# Patient Record
Sex: FEMALE | Race: BLACK OR AFRICAN AMERICAN | NOT HISPANIC OR LATINO | URBAN - METROPOLITAN AREA
[De-identification: names, ages, dates, MRNs, and addresses within clinical notes are randomized per-mention and may not be internally consistent; named-entity substitution may affect disease eponyms.]

---

## 2021-11-09 ENCOUNTER — OFFICE VISIT (OUTPATIENT)
Dept: URBAN - METROPOLITAN AREA CLINIC 86 | Facility: CLINIC | Age: 61
End: 2021-11-09

## 2022-01-04 ENCOUNTER — OFFICE VISIT (OUTPATIENT)
Dept: URBAN - METROPOLITAN AREA CLINIC 86 | Facility: CLINIC | Age: 62
End: 2022-01-04

## 2022-02-01 ENCOUNTER — OFFICE VISIT (OUTPATIENT)
Dept: URBAN - METROPOLITAN AREA CLINIC 86 | Facility: CLINIC | Age: 62
End: 2022-02-01
Payer: COMMERCIAL

## 2022-02-01 VITALS
DIASTOLIC BLOOD PRESSURE: 61 MMHG | SYSTOLIC BLOOD PRESSURE: 122 MMHG | BODY MASS INDEX: 29.66 KG/M2 | HEART RATE: 80 BPM | TEMPERATURE: 96.9 F | HEIGHT: 65 IN | WEIGHT: 178 LBS

## 2022-02-01 DIAGNOSIS — G93.40 ENCEPHALOPATHY: ICD-10-CM

## 2022-02-01 DIAGNOSIS — R18.8 OTHER ASCITES: ICD-10-CM

## 2022-02-01 DIAGNOSIS — Z71.89 VACCINE COUNSELING: ICD-10-CM

## 2022-02-01 DIAGNOSIS — K76.0 FATTY LIVER: ICD-10-CM

## 2022-02-01 PROCEDURE — 99204 OFFICE O/P NEW MOD 45 MIN: CPT | Performed by: PHYSICIAN ASSISTANT

## 2022-02-01 RX ORDER — FOLIC ACID 1 MG/1
1 TABLET TABLET ORAL ONCE A DAY
Status: ACTIVE | COMMUNITY

## 2022-02-01 RX ORDER — FUROSEMIDE 20 MG/1
1 TABLET TABLET ORAL ONCE A DAY
Status: ACTIVE | COMMUNITY

## 2022-02-01 RX ORDER — POTASSIUM CHLORIDE 1.5 G/1.58G
1 PACKET WITH FOOD POWDER, FOR SOLUTION ORAL ONCE A DAY
Status: ACTIVE | COMMUNITY

## 2022-02-01 RX ORDER — BENAZEPRIL HYDROCHLORIDE 10 MG/1
1 TABLET TABLET, FILM COATED ORAL ONCE A DAY
Status: ACTIVE | COMMUNITY

## 2022-02-01 RX ORDER — NADOLOL 20 MG/1
2 TABLETS TABLET ORAL ONCE A DAY
Status: ACTIVE | COMMUNITY

## 2022-02-01 RX ORDER — SPIRONOLACTONE 25 MG/1
1 TABLET TABLET, FILM COATED ORAL
Status: ACTIVE | COMMUNITY

## 2022-02-01 RX ORDER — PANTOPRAZOLE SODIUM 40 MG/1
1 TABLET TABLET, DELAYED RELEASE ORAL ONCE A DAY
Status: ACTIVE | COMMUNITY

## 2022-02-01 NOTE — EXAM-PHYSICAL EXAM
General: pleasant, well developed, well nourished, no acute distress, non ill appearing Eyes- no scleral icterus HENT: normocephalic, atraumatic head, face mask covering mouth and nose Neck: supple, no JVD Chest: normal breath sounds, normal work of breathing Heart: regular rate and rhythm without murmur Abdomen: soft, non tender, non distended, bowel sounds present, no hepatomegaly, no splenomegaly, no ascites Musculoskeletal: normal gait and station Extremities: no edema, no asterixis, no palmar erythema Skin: no rashes, no jaundice Neurologic: no focal deficits, alert and oriented x 3 Psychiatric: stable mood, appropriate affect  General: pleasant, well developed, well nourished, no acute distress, non ill appearing Eyes- no scleral icterus HENT: normocephalic, atraumatic head, face mask covering mouth and nose Neck: supple, no JVD Chest: normal breath sounds, normal work of breathing Heart: regular rate and rhythm without murmur Abdomen: soft, non tender, non distended, bowel sounds present, no hepatomegaly, no splenomegaly, no ascites Musculoskeletal: normal gait and station Extremities: no edema, no asterixis, no palmar erythema Skin: no rashes, no jaundice Neurologic: no focal deficits, alert and oriented x 3 Psychiatric: stable mood, appropriate affect

## 2022-02-01 NOTE — HPI-TODAY'S VISIT:
Patient was referred by Coleen Sánchez NP for an evaluation of fatty liver.  A copy of this note will be sent to the referring provider.   Patient here for new patient visit in office.  In oct 2021 went to hospital. was planning to go to work.  Labs from October 2021 show hemoglobin low 11.4, platelets low 99,000, , ALT 74, total bilirubin 3.9, sodium 137, potassium 3.6, creatinine 0.96, HDL 41, , elevated.  Ammonia elevated 133, abdominal limited ultrasound from October 2021 shows no ascites.  Was supposed to have an EGD in August of last year, will see if this was done.  Had a hospitalization for acute encephalopathy.  On lactulose for this.  Duration of visit 45 mins with over 50% of the time explaining patients condition and treatment plan and reviewing records

## 2022-03-21 ENCOUNTER — OFFICE VISIT (OUTPATIENT)
Dept: URBAN - METROPOLITAN AREA CLINIC 86 | Facility: CLINIC | Age: 62
End: 2022-03-21

## 2022-03-30 ENCOUNTER — OFFICE VISIT (OUTPATIENT)
Dept: URBAN - METROPOLITAN AREA TELEHEALTH 2 | Facility: TELEHEALTH | Age: 62
End: 2022-03-30
Payer: COMMERCIAL

## 2022-03-30 VITALS — HEIGHT: 65 IN | WEIGHT: 185 LBS | BODY MASS INDEX: 30.82 KG/M2

## 2022-03-30 DIAGNOSIS — R74.8 ABNORMAL LIVER ENZYMES: ICD-10-CM

## 2022-03-30 DIAGNOSIS — R79.89 ELEVATED FERRITIN LEVEL: ICD-10-CM

## 2022-03-30 DIAGNOSIS — K76.0 FATTY LIVER: ICD-10-CM

## 2022-03-30 DIAGNOSIS — R76.0 ABNORMAL ANTIBODY TITER: ICD-10-CM

## 2022-03-30 PROCEDURE — 99214 OFFICE O/P EST MOD 30 MIN: CPT

## 2022-03-30 RX ORDER — FUROSEMIDE 20 MG/1
1 TABLET TABLET ORAL ONCE A DAY
Status: ACTIVE | COMMUNITY

## 2022-03-30 RX ORDER — FOLIC ACID 1 MG/1
1 TABLET TABLET ORAL ONCE A DAY
Status: ACTIVE | COMMUNITY

## 2022-03-30 RX ORDER — BENAZEPRIL HYDROCHLORIDE 10 MG/1
1 TABLET TABLET, FILM COATED ORAL ONCE A DAY
Status: ACTIVE | COMMUNITY

## 2022-03-30 RX ORDER — PANTOPRAZOLE SODIUM 40 MG/1
1 TABLET TABLET, DELAYED RELEASE ORAL ONCE A DAY
Status: ACTIVE | COMMUNITY

## 2022-03-30 RX ORDER — RIFAXIMIN 550 MG/1
1 TABLET TABLET ORAL TWICE A DAY
Qty: 60 | OUTPATIENT
Start: 2022-03-30 | End: 2022-04-29

## 2022-03-30 RX ORDER — POTASSIUM CHLORIDE 1.5 G/1.58G
1 PACKET WITH FOOD POWDER, FOR SOLUTION ORAL ONCE A DAY
Status: ACTIVE | COMMUNITY

## 2022-03-30 RX ORDER — NADOLOL 20 MG/1
2 TABLETS TABLET ORAL ONCE A DAY
Status: ACTIVE | COMMUNITY

## 2022-03-30 RX ORDER — CHOLESTYRAMINE 4 G/9G
1 SCOOP POWDER, FOR SUSPENSION ORAL ONCE A DAY
Status: ACTIVE | COMMUNITY

## 2022-03-30 RX ORDER — SPIRONOLACTONE 25 MG/1
1 TABLET TABLET, FILM COATED ORAL
Status: ACTIVE | COMMUNITY

## 2022-03-30 NOTE — HPI-TODAY'S VISIT:
Patient was referred by Coleen Sánchez NP for an evaluation of fatty liver.  A copy of this note will be sent to the referring provider.       Patient here for TH visit. she is on lactulose tid, having multiple bm's will send rx for xifaxan.   labs were not sent to us from [place] and reviewing now. MELD 21. will refer pt to [place] transplant clinic. overall feeling better and no symptoms.  feb 2022 labs na low 132, glucose 76, cr 1.04, albumin low 2.6. ammonia elevated 66. tb 5.7, ast 144, alt 71, alp 338. iron sat unable to calculate. ferritin elevated 839. f actin ab elevated 21, ama 6.3, smooth musc ab abnormal 1:20. immune to hep a. need hep b vaccine. hep c neg. alpha 1 MM.    RECAP: In oct 2021 went to hospital. was planning to go to work.  Labs from October 2021 show hemoglobin low 11.4, platelets low 99,000, , ALT 74, total bilirubin 3.9, sodium 137, potassium 3.6, creatinine 0.96, HDL 41, , elevated.  Ammonia elevated 133, abdominal limited ultrasound from October 2021 shows no ascites.  Was supposed to have an EGD in August of last year, will see if this was done.  Had a hospitalization for acute encephalopathy.  On lactulose for this.  Duration of visit 45 mins with over 50% of the time explaining patients condition and treatment plan and reviewing records

## 2022-04-04 ENCOUNTER — TELEPHONE ENCOUNTER (OUTPATIENT)
Dept: URBAN - METROPOLITAN AREA CLINIC 92 | Facility: CLINIC | Age: 62
End: 2022-04-04

## 2022-04-05 ENCOUNTER — TELEPHONE ENCOUNTER (OUTPATIENT)
Dept: URBAN - METROPOLITAN AREA CLINIC 92 | Facility: CLINIC | Age: 62
End: 2022-04-05

## 2022-04-06 ENCOUNTER — TELEPHONE ENCOUNTER (OUTPATIENT)
Dept: URBAN - METROPOLITAN AREA CLINIC 92 | Facility: CLINIC | Age: 62
End: 2022-04-06

## 2022-04-06 LAB
A/G RATIO: 0.9
ALBUMIN: 3
ALKALINE PHOSPHATASE: 301
ALT (SGPT): 48
AST (SGOT): 93
BASO (ABSOLUTE): 0
BASOS: 1
BILIRUBIN, TOTAL: 3.4
BUN/CREATININE RATIO: 9
BUN: 11
CALCIUM: 8.6
CARBON DIOXIDE, TOTAL: 22
CHLORIDE: 106
CREATININE: 1.2
EGFR: 51
EOS (ABSOLUTE): 0.4
EOS: 12
GLOBULIN, TOTAL: 3.3
GLUCOSE: 88
HEMATOCRIT: 34.5
HEMATOLOGY COMMENTS:: (no result)
HEMOGLOBIN: 11.2
HEREDITARY  HEMOCHROMATOSIS: (no result)
IMMATURE CELLS: (no result)
IMMATURE GRANS (ABS): 0
IMMATURE GRANULOCYTES: 0
IMMUNOGLOBULIN G, QN, SERUM: 1673
IMMUNOGLOBULIN M, QN, SERUM: 61
INR: 1.2
LYMPHS (ABSOLUTE): 0.9
LYMPHS: 25
Lab: (no result)
MCH: 31.6
MCHC: 32.5
MCV: 98
MONOCYTES(ABSOLUTE): 0.4
MONOCYTES: 10
NEUTROPHILS (ABSOLUTE): 1.9
NEUTROPHILS: 52
NRBC: (no result)
PLATELETS: 104
POTASSIUM: 4.1
PROTEIN, TOTAL: 6.3
PROTHROMBIN TIME: 13
RBC: 3.54
RDW: 13.6
SODIUM: 139
WBC: 3.6

## 2022-04-06 NOTE — HPI-TODAY'S VISIT:
Dear Isabel Butt, April 5 MRI shows lower thorax was normal. Liver had mild iron deposition. They did see morphologic changes of chronic liver disease with a diffusely nodular contour, small nodules, and caudate lobe hypertrophy. They did see a diffusely reticular pattern a bit lately but has been this compatible with extensive fibrosis.  They did not however see any suspicious liver lesions. The gallbladder was not well seen and may be absent.  No bile duct dilation seen. Spleen was top normal. Pancreas was normal. Adrenal glands were normal. Bilateral renal cysts were seen. Some prominent right cardiophrenic lymph nodes were seen measuring up to 1.9 x 1.0 cm. Extensive collateral vessels including esophageal varices, gastric wall varices and large pelvic collaterals were seen measuring up to 2.1 cm. Trace perihepatic ascites was seen. Overall they felt that you had signs of advanced chronic liver disease but with no suspicious lesions and they recommended a 6-month surveillance. They did see signs of portal hypertension including the esophageal varices, gastric varices and a large pelvic collaterals and trace perihepatic ascites. I saw from the notes that you were pending an EGD surveillance and that with Cheli had recommended for you to do that.  We agree with that issue. Dr. Leary

## 2022-04-07 ENCOUNTER — TELEPHONE ENCOUNTER (OUTPATIENT)
Dept: URBAN - METROPOLITAN AREA CLINIC 92 | Facility: CLINIC | Age: 62
End: 2022-04-07

## 2022-04-26 ENCOUNTER — TELEPHONE ENCOUNTER (OUTPATIENT)
Dept: URBAN - METROPOLITAN AREA CLINIC 92 | Facility: CLINIC | Age: 62
End: 2022-04-26

## 2022-04-28 ENCOUNTER — WEB ENCOUNTER (OUTPATIENT)
Dept: URBAN - METROPOLITAN AREA CLINIC 92 | Facility: CLINIC | Age: 62
End: 2022-04-28

## 2022-05-01 ENCOUNTER — ERX REFILL RESPONSE (OUTPATIENT)
Dept: URBAN - METROPOLITAN AREA CLINIC 86 | Facility: CLINIC | Age: 62
End: 2022-05-01

## 2022-05-01 RX ORDER — RIFAXIMIN 550 MG/1
TAKE 1 TABLET BY MOUTH TWICE (2) DAILY TABLET ORAL
Qty: 60 TABLET | Refills: 2 | OUTPATIENT

## 2022-05-01 RX ORDER — RIFAXIMIN 550 MG/1
TAKE 1 TABLET BY MOUTH TWICE (2) DAILY TABLET ORAL
Qty: 60 TABLET | Refills: 1 | OUTPATIENT

## 2022-05-04 ENCOUNTER — OFFICE VISIT (OUTPATIENT)
Dept: URBAN - METROPOLITAN AREA TELEHEALTH 2 | Facility: TELEHEALTH | Age: 62
End: 2022-05-04
Payer: COMMERCIAL

## 2022-05-04 VITALS — WEIGHT: 178 LBS | BODY MASS INDEX: 29.66 KG/M2 | HEIGHT: 65 IN

## 2022-05-04 DIAGNOSIS — K76.0 FATTY LIVER: ICD-10-CM

## 2022-05-04 DIAGNOSIS — R18.8 OTHER ASCITES: ICD-10-CM

## 2022-05-04 DIAGNOSIS — R74.8 ABNORMAL LIVER ENZYMES: ICD-10-CM

## 2022-05-04 DIAGNOSIS — G93.40 ENCEPHALOPATHY: ICD-10-CM

## 2022-05-04 PROCEDURE — 99214 OFFICE O/P EST MOD 30 MIN: CPT

## 2022-05-04 RX ORDER — FUROSEMIDE 20 MG/1
1 TABLET TABLET ORAL ONCE A DAY
Status: ACTIVE | COMMUNITY

## 2022-05-04 RX ORDER — RIFAXIMIN 550 MG/1
1 TABLET TABLET ORAL TWICE A DAY
Qty: 60 TABLET | Refills: 3

## 2022-05-04 RX ORDER — FOLIC ACID 1 MG/1
1 TABLET TABLET ORAL ONCE A DAY
Status: ACTIVE | COMMUNITY

## 2022-05-04 RX ORDER — BENAZEPRIL HYDROCHLORIDE 10 MG/1
1 TABLET TABLET, FILM COATED ORAL ONCE A DAY
Status: ACTIVE | COMMUNITY

## 2022-05-04 RX ORDER — SPIRONOLACTONE 25 MG/1
1 TABLET TABLET, FILM COATED ORAL
Status: ACTIVE | COMMUNITY

## 2022-05-04 RX ORDER — NADOLOL 20 MG/1
2 TABLETS TABLET ORAL ONCE A DAY
Status: ACTIVE | COMMUNITY

## 2022-05-04 RX ORDER — POTASSIUM CHLORIDE 1.5 G/1.58G
1 PACKET WITH FOOD POWDER, FOR SOLUTION ORAL ONCE A DAY
Status: ACTIVE | COMMUNITY

## 2022-05-04 RX ORDER — CHOLESTYRAMINE 4 G/9G
1 SCOOP POWDER, FOR SUSPENSION ORAL ONCE A DAY
Status: ACTIVE | COMMUNITY

## 2022-05-04 RX ORDER — PANTOPRAZOLE SODIUM 40 MG/1
1 TABLET TABLET, DELAYED RELEASE ORAL ONCE A DAY
Status: ACTIVE | COMMUNITY

## 2022-05-04 NOTE — HPI-TODAY'S VISIT:
Patient was referred by Coleen Sánchez NP for an evaluation of fatty liver.  A copy of this note will be sent to the referring provider.          Patient here for TH visit. she is on lactulose tid, having multiple bm's will send rx for xifaxan.   due to MELD 21-she was sent to the transplant team, appreciate input, MELD improved to 16, she is working on her diet and being monitored. they are planning to repeat imaging in oct 2022 and will in 3 months with just labs.    April 5 MRI shows lower thorax was normal. Liver had mild iron deposition. They did see morphologic changes of chronic liver disease with a diffusely nodular contour, small nodules, and caudate lobe hypertrophy. They did see a diffusely reticular pattern a bit lately but has been this compatible with extensive fibrosis.  They did not however see any suspicious liver lesions. The gallbladder was not well seen and may be absent.  No bile duct dilation seen. Spleen was top normal. Pancreas was normal. Adrenal glands were normal. Bilateral renal cysts were seen. Some prominent right cardiophrenic lymph nodes were seen measuring up to 1.9 x 1.0 cm. Extensive collateral vessels including esophageal varices, gastric wall varices and large pelvic collaterals were seen measuring up to 2.1 cm. Trace perihepatic ascites was seen. Overall they felt that you had signs of advanced chronic liver disease but with no suspicious lesions and they recommended a 6-month surveillance. They did see signs of portal hypertension including the esophageal varices, gastric varices and a large pelvic collaterals and trace perihepatic ascites. I saw from the notes that you were pending an EGD surveillance and that with Cheli had recommended for you to do that.    RECAP: feb 2022 labs na low 132, glucose 76, cr 1.04, albumin low 2.6. ammonia elevated 66. tb 5.7, ast 144, alt 71, alp 338. iron sat unable to calculate. ferritin elevated 839. f actin ab elevated 21, ama 6.3, smooth musc ab abnormal 1:20. immune to hep a. need hep b vaccine. hep c neg. alpha 1 MM.

## 2022-05-06 ENCOUNTER — TELEPHONE ENCOUNTER (OUTPATIENT)
Dept: URBAN - METROPOLITAN AREA CLINIC 92 | Facility: CLINIC | Age: 62
End: 2022-05-06

## 2022-08-03 ENCOUNTER — LAB OUTSIDE AN ENCOUNTER (OUTPATIENT)
Dept: URBAN - METROPOLITAN AREA TELEHEALTH 2 | Facility: TELEHEALTH | Age: 62
End: 2022-08-03

## 2022-08-15 ENCOUNTER — OFFICE VISIT (OUTPATIENT)
Dept: URBAN - METROPOLITAN AREA TELEHEALTH 2 | Facility: TELEHEALTH | Age: 62
End: 2022-08-15
Payer: COMMERCIAL

## 2022-08-15 VITALS
WEIGHT: 178 LBS | SYSTOLIC BLOOD PRESSURE: 127 MMHG | DIASTOLIC BLOOD PRESSURE: 80 MMHG | BODY MASS INDEX: 29.66 KG/M2 | HEIGHT: 65 IN

## 2022-08-15 DIAGNOSIS — K74.69 CIRRHOSIS, CRYPTOGENIC: ICD-10-CM

## 2022-08-15 DIAGNOSIS — G93.49 OTHER ENCEPHALOPATHY: ICD-10-CM

## 2022-08-15 DIAGNOSIS — R18.8 OTHER ASCITES: ICD-10-CM

## 2022-08-15 DIAGNOSIS — K76.0 FATTY LIVER: ICD-10-CM

## 2022-08-15 PROBLEM — 443913008: Status: ACTIVE | Noted: 2022-07-31

## 2022-08-15 PROBLEM — 365799007: Status: ACTIVE | Noted: 2022-07-31

## 2022-08-15 PROBLEM — 161646004 H/O: HIGH RISK MEDICATION: Status: ACTIVE | Noted: 2022-08-15

## 2022-08-15 PROBLEM — 166643006: Status: ACTIVE | Noted: 2022-07-31

## 2022-08-15 PROBLEM — 365589000: Status: ACTIVE | Noted: 2022-07-31

## 2022-08-15 PROCEDURE — 99214 OFFICE O/P EST MOD 30 MIN: CPT

## 2022-08-15 RX ORDER — PANTOPRAZOLE SODIUM 40 MG/1
1 TABLET TABLET, DELAYED RELEASE ORAL ONCE A DAY
Status: ACTIVE | COMMUNITY

## 2022-08-15 RX ORDER — RIFAXIMIN 550 MG/1
1 TABLET TABLET ORAL TWICE A DAY
Qty: 60 TABLET | Refills: 3
End: 2022-12-13

## 2022-08-15 RX ORDER — CHOLESTYRAMINE 4 G/9G
1 SCOOP POWDER, FOR SUSPENSION ORAL ONCE A DAY
Status: ON HOLD | COMMUNITY

## 2022-08-15 RX ORDER — RIFAXIMIN 550 MG/1
1 TABLET TABLET ORAL TWICE A DAY
Qty: 60 TABLET | Refills: 3 | Status: ACTIVE | COMMUNITY

## 2022-08-15 RX ORDER — MULTIVITAMIN
1 TABLET TABLET ORAL ONCE A DAY
Status: ACTIVE | COMMUNITY

## 2022-08-15 RX ORDER — FOLIC ACID 1 MG/1
1 TABLET TABLET ORAL ONCE A DAY
Status: ACTIVE | COMMUNITY

## 2022-08-15 RX ORDER — NADOLOL 20 MG/1
2 TABLETS TABLET ORAL ONCE A DAY
Status: ACTIVE | COMMUNITY

## 2022-08-15 RX ORDER — BENAZEPRIL HYDROCHLORIDE 10 MG/1
1 TABLET TABLET, FILM COATED ORAL ONCE A DAY
Status: ACTIVE | COMMUNITY

## 2022-08-15 RX ORDER — LACTULOSE 10 G/15ML
15 ML AS NEEDED SOLUTION ORAL ONCE A DAY
Status: ACTIVE | COMMUNITY

## 2022-08-15 RX ORDER — SPIRONOLACTONE 25 MG/1
1 TABLET TABLET, FILM COATED ORAL ONCE A DAY
Status: ACTIVE | COMMUNITY

## 2022-08-15 RX ORDER — POTASSIUM CHLORIDE 1.5 G/1.58G
1 PACKET WITH FOOD POWDER, FOR SOLUTION ORAL ONCE A DAY
Status: ACTIVE | COMMUNITY

## 2022-08-15 RX ORDER — FUROSEMIDE 20 MG/1
1 TABLET TABLET ORAL ONCE A DAY
Status: ACTIVE | COMMUNITY

## 2022-08-22 PROBLEM — 81308009 ENCEPHALOPATHY: Status: ACTIVE | Noted: 2022-08-22

## 2022-09-12 ENCOUNTER — WEB ENCOUNTER (OUTPATIENT)
Dept: URBAN - METROPOLITAN AREA CLINIC 86 | Facility: CLINIC | Age: 62
End: 2022-09-12

## 2022-11-14 PROBLEM — 389026000: Status: ACTIVE | Noted: 2022-02-01

## 2022-11-14 PROBLEM — 266468003 CIRRHOSIS - NON-ALCOHOLIC: Status: ACTIVE | Noted: 2022-08-15

## 2022-11-14 PROBLEM — 81308009: Status: ACTIVE | Noted: 2022-02-01

## 2022-11-14 PROBLEM — 60737008 IRON OVERLOAD: Status: ACTIVE | Noted: 2022-07-31

## 2022-11-14 PROBLEM — 197321007 FATTY LIVER: Status: ACTIVE | Noted: 2022-02-01

## 2022-11-15 ENCOUNTER — OFFICE VISIT (OUTPATIENT)
Dept: URBAN - METROPOLITAN AREA CLINIC 86 | Facility: CLINIC | Age: 62
End: 2022-11-15

## 2022-11-15 RX ORDER — RIFAXIMIN 550 MG/1
1 TABLET TABLET ORAL TWICE A DAY
Qty: 60 TABLET | Refills: 3

## 2022-11-15 NOTE — HPI-TODAY'S VISIT:
Patient is a 61 yo f and last seen may 2022 by Ms Primitivo Green and prior referred by Coleen Sánchez NP for an evaluation of fatty liver and cirrhosis and pse.   A copy of this note will be sent to the referring provider.        May visit with Cheli: Patient here for TH visit. she is on lactulose tid, having multiple bm's will send rx for xifaxan.   She did get it and she says it is helping with the memory.  Did labs today and will take a few days and [facility]  Prior due to MELD 21-she was sent to the transplant team, and they saw and we appreciate their input, MELD improved to 16, she is working on her diet and wanted her to be monitored.   They are planning to repeat imaging in Nov 15 and do labs and see Dr Khan then also.    She says she saw them already.  April 5 MRI shows lower thorax was normal. Liver had mild iron deposition. They did see morphologic changes of chronic liver disease with a diffusely nodular contour, small nodules, and caudate lobe hypertrophy. They did see a diffusely reticular pattern a bit lately but has been this compatible with extensive fibrosis.  They did not however see any suspicious liver lesions. The gallbladder was not well seen and may be absent.  No bile duct dilation seen. Spleen was top normal. Pancreas was normal. Adrenal glands were normal. Bilateral renal cysts were seen. Some prominent right cardiophrenic lymph nodes were seen measuring up to 1.9 x 1.0 cm. Extensive collateral vessels including esophageal varices, gastric wall varices and large pelvic collaterals were seen measuring up to 2.1 cm. Trace perihepatic ascites was seen. Overall they felt that you had signs of advanced chronic liver disease but with no suspicious lesions and they recommended a 6-month surveillance. They did see signs of portal hypertension including the esophageal varices, gastric varices and a large pelvic collaterals and trace perihepatic ascites.  I saw from the notes that you were pending an EGD surveillance and and she is doing that with local doctor for egd and colon.  feb 2022 labs na low 132, glucose 76, cr 1.04, albumin low 2.6. ammonia elevated 66. tb 5.7, ast 144, alt 71, alp 338. iron sat unable to calculate. ferritin elevated 839. f actin ab elevated 21, ama 6.3, smooth musc ab abnormal 1:20. immune to hep a. need hep b vaccine. hep c neg. alpha 1 MM.  Plan: 1. We need to be sure that we get the labs that she is doing. 2. Nov 15 mri and labs and lamonte to seem 3. Planning to see her at end of november and see how doing. 4. Staying on the low fat diet.  Stressed to pt the need for social distancing and strict handwashing and wearing a mask and to follow any other new or added CDC recommendations as this is an evolving target.  Duration of the visit was 30 min with 10 min of chart prep reviewing data and information that was available for the visit and setting up in ecw and then an additional 20 min from 124 to 144 pm by clock today as healow clock fluxed for the telemed visit today via [employer]  with time spent reviewing said material and their clinical correlates and then with this information being used to formulate a treatment plan.

## 2023-02-10 ENCOUNTER — ERX REFILL RESPONSE (OUTPATIENT)
Dept: URBAN - METROPOLITAN AREA CLINIC 86 | Facility: CLINIC | Age: 63
End: 2023-02-10

## 2023-02-10 RX ORDER — RIFAXIMIN 550 MG/1
TAKE 1 TABLET BY MOUTH TWICE (2) DAILY TABLET ORAL
Qty: 60 TABLET | Refills: 0 | OUTPATIENT

## 2023-02-10 RX ORDER — RIFAXIMIN 550 MG/1
1 TABLET TABLET ORAL TWICE A DAY
Qty: 60 TABLET | Refills: 3 | OUTPATIENT

## 2023-07-14 ENCOUNTER — OFFICE VISIT (OUTPATIENT)
Dept: URBAN - METROPOLITAN AREA TELEHEALTH 2 | Facility: TELEHEALTH | Age: 63
End: 2023-07-14
Payer: COMMERCIAL

## 2023-07-14 VITALS — HEIGHT: 65 IN

## 2023-07-14 DIAGNOSIS — K76.0 FATTY LIVER: ICD-10-CM

## 2023-07-14 DIAGNOSIS — R18.8 OTHER ASCITES: ICD-10-CM

## 2023-07-14 DIAGNOSIS — K74.69 CIRRHOSIS, CRYPTOGENIC: ICD-10-CM

## 2023-07-14 DIAGNOSIS — R74.8 ABNORMAL LIVER ENZYMES: ICD-10-CM

## 2023-07-14 PROCEDURE — 99443 PHONE E/M BY PHYS 21-30 MIN: CPT

## 2023-07-14 PROCEDURE — 99214 OFFICE O/P EST MOD 30 MIN: CPT

## 2023-07-14 RX ORDER — RIFAXIMIN 550 MG/1
TAKE 1 TABLET BY MOUTH TWICE (2) DAILY TABLET ORAL
Qty: 60 TABLET | Refills: 2

## 2023-07-14 RX ORDER — NADOLOL 20 MG/1
2 TABLETS TABLET ORAL ONCE A DAY
Status: ACTIVE | COMMUNITY

## 2023-07-14 RX ORDER — CHOLESTYRAMINE 4 G/9G
1 SCOOP POWDER, FOR SUSPENSION ORAL ONCE A DAY
Status: ON HOLD | COMMUNITY

## 2023-07-14 RX ORDER — FUROSEMIDE 20 MG/1
1 TABLET TABLET ORAL ONCE A DAY
Status: ACTIVE | COMMUNITY

## 2023-07-14 RX ORDER — FOLIC ACID 1 MG/1
1 TABLET TABLET ORAL ONCE A DAY
Status: ACTIVE | COMMUNITY

## 2023-07-14 RX ORDER — PANTOPRAZOLE SODIUM 40 MG/1
1 TABLET TABLET, DELAYED RELEASE ORAL ONCE A DAY
Status: ACTIVE | COMMUNITY

## 2023-07-14 RX ORDER — RIFAXIMIN 550 MG/1
TAKE 1 TABLET BY MOUTH TWICE (2) DAILY TABLET ORAL
Qty: 60 TABLET | Refills: 0 | Status: ACTIVE | COMMUNITY

## 2023-07-14 RX ORDER — POTASSIUM CHLORIDE 1.5 G/1.58G
1 PACKET WITH FOOD POWDER, FOR SOLUTION ORAL ONCE A DAY
Status: ACTIVE | COMMUNITY

## 2023-07-14 RX ORDER — BENAZEPRIL HYDROCHLORIDE 10 MG/1
1 TABLET TABLET, FILM COATED ORAL ONCE A DAY
Status: ACTIVE | COMMUNITY

## 2023-07-14 RX ORDER — MULTIVITAMIN
1 TABLET TABLET ORAL ONCE A DAY
Status: ACTIVE | COMMUNITY

## 2023-07-14 RX ORDER — LACTULOSE 10 G/15ML
15 ML AS NEEDED SOLUTION ORAL ONCE A DAY
Status: ACTIVE | COMMUNITY

## 2023-07-14 RX ORDER — LACTULOSE 10 G/15ML
15 ML AS NEEDED SOLUTION ORAL
Qty: 1350 ML | Refills: 2

## 2023-07-14 RX ORDER — SPIRONOLACTONE 25 MG/1
1 TABLET TABLET, FILM COATED ORAL ONCE A DAY
Status: ACTIVE | COMMUNITY

## 2023-07-14 NOTE — HPI-TODAY'S VISIT:
Patient is a 64 yo f and last seen August 2022 and prior referred by Coleen Sánchez NP for an evaluation of fatty liver and cirrhosis and pse. A copy of this note will be sent to the referring provider.       7/13/23 telemed Was suppposed to be seen in nov but canceled and was not contacted to rebook by the office July 11, 2023 labs with INR 1.56, complete blood count with white blood cells 5.6, red blood cells 3.91, hemoglobin 9.3, platelets 136, MCV 92, AFP 4.2.  Sodium 133, potassium 3.8, creatinine up at 1.34, alkaline phosphatase 239, AST 91, ALT 47, bilirubin 5.6.  Previously on March 6 30th the bilirubin was 5.2, alkaline phosphatase 278,  and ALT 68.  3/2023 MRI EXAM: MR ABDOMEN W AND WO CONTRAST   CLINICAL INDICATION: Liver cirrhosis.   TECHNIQUE: Multisequence, multiplanar MRI of the abdomen was performed without and with intravenous contrast. [id number].[id number]   COMPARISON: 4/5/2022   FINDINGS:  Lower Thorax: Normal.   Liver: No fat or iron. Cirrhotic multinodular liver with reticular fibrosis. No hepatocellular carcinoma.   Gallbladder/Biliary Tree: Status post cholecystectomy.   Spleen: Mildly enlarged measuring 14 cm in AP dimension.   Pancreas: Normal.   Adrenal Glands: Normal.    Kidneys/Ureters: Renal cysts.   Gastrointestinal: Susceptibility artifact in the upper abdomen using a GE junction limiting evaluation..    Lymph Nodes: Prominent periportal lymph nodes could be reactive.   Vessels: Dilated varicose IMV likely due to pelvic portacaval shunt. Small upper abdominal varices Patent portal vein.   Peritoneum/Retroperitoneum: Trace ascites.   Bones/Soft Tissues: Mild anasarca.   IMPRESSION: End-stage cirrhosis with stigmata of portal hypertension. No hepatocellular carcinoma. Patent portal vein.   MELD 24 and on transplant list she is doing labs in 2 weeks to recheck the creatine. they lowered the dos eof the aldactone to every other day she is out of xifaxan  taking lactulose 3 times a day, discussed need to lower this if having the diarrhea egd done MRI due  recap May visit with Cheli: Patient here for TH visit. she is on lactulose tid, having multiple bm's will send rx for xifaxan.   She did get it and she says it is helping with the memory.  Did labs today and will take a few days and [facility].  Prior due to MELD 21-she was sent to the transplant team, and they saw and we appreciate their input, MELD improved to 16, she is working on her diet and wanted her to be monitored.   They are planning to repeat imaging in Nov 15 and do labs and see Dr Khan then also.    She says she saw them already.  April 5 MRI shows lower thorax was normal. Liver had mild iron deposition. They did see morphologic changes of chronic liver disease with a diffusely nodular contour, small nodules, and caudate lobe hypertrophy. They did see a diffusely reticular pattern a bit lately but has been this compatible with extensive fibrosis.  They did not however see any suspicious liver lesions. The gallbladder was not well seen and may be absent.  No bile duct dilation seen. Spleen was top normal. Pancreas was normal. Adrenal glands were normal. Bilateral renal cysts were seen. Some prominent right cardiophrenic lymph nodes were seen measuring up to 1.9 x 1.0 cm. Extensive collateral vessels including esophageal varices, gastric wall varices and large pelvic collaterals were seen measuring up to 2.1 cm. Trace perihepatic ascites was seen. Overall they felt that you had signs of advanced chronic liver disease but with no suspicious lesions and they recommended a 6-month surveillance. They did see signs of portal hypertension including the esophageal varices, gastric varices and a large pelvic collaterals and trace perihepatic ascites.  I saw from the notes that you were pending an EGD surveillance and and she is doing that with local doctor for egd and colon.  feb 2022 labs na low 132, glucose 76, cr 1.04, albumin low 2.6. ammonia elevated 66. tb 5.7, ast 144, alt 71, alp 338. iron sat unable to calculate. ferritin elevated 839. f actin ab elevated 21, ama 6.3, smooth musc ab abnormal 1:20. immune to hep a. need hep b vaccine. hep c neg. alpha 1 MM.

## 2023-07-17 ENCOUNTER — TELEPHONE ENCOUNTER (OUTPATIENT)
Dept: URBAN - METROPOLITAN AREA CLINIC 92 | Facility: CLINIC | Age: 63
End: 2023-07-17

## 2023-07-18 ENCOUNTER — TELEPHONE ENCOUNTER (OUTPATIENT)
Dept: URBAN - METROPOLITAN AREA CLINIC 92 | Facility: CLINIC | Age: 63
End: 2023-07-18

## 2023-07-21 ENCOUNTER — WEB ENCOUNTER (OUTPATIENT)
Dept: URBAN - METROPOLITAN AREA CLINIC 86 | Facility: CLINIC | Age: 63
End: 2023-07-21

## 2023-07-21 RX ORDER — RIFAXIMIN 550 MG/1
TAKE 1 TABLET BY MOUTH TWICE (2) DAILY TABLET ORAL
Qty: 60 TABLET | Refills: 0
End: 2023-10-19

## 2023-07-24 ENCOUNTER — TELEPHONE ENCOUNTER (OUTPATIENT)
Dept: URBAN - METROPOLITAN AREA CLINIC 86 | Facility: CLINIC | Age: 63
End: 2023-07-24

## 2023-08-10 ENCOUNTER — TELEPHONE ENCOUNTER (OUTPATIENT)
Dept: URBAN - METROPOLITAN AREA CLINIC 6 | Facility: CLINIC | Age: 63
End: 2023-08-10

## 2023-08-11 ENCOUNTER — LAB OUTSIDE AN ENCOUNTER (OUTPATIENT)
Dept: URBAN - METROPOLITAN AREA CLINIC 86 | Facility: CLINIC | Age: 63
End: 2023-08-11

## 2023-08-14 ENCOUNTER — TELEPHONE ENCOUNTER (OUTPATIENT)
Dept: URBAN - METROPOLITAN AREA CLINIC 86 | Facility: CLINIC | Age: 63
End: 2023-08-14

## 2023-08-16 ENCOUNTER — TELEPHONE ENCOUNTER (OUTPATIENT)
Dept: URBAN - METROPOLITAN AREA CLINIC 86 | Facility: CLINIC | Age: 63
End: 2023-08-16

## 2023-08-16 ENCOUNTER — OFFICE VISIT (OUTPATIENT)
Dept: URBAN - METROPOLITAN AREA TELEHEALTH 2 | Facility: TELEHEALTH | Age: 63
End: 2023-08-16
Payer: COMMERCIAL

## 2023-08-16 VITALS — BODY MASS INDEX: 23.73 KG/M2 | HEIGHT: 64 IN | WEIGHT: 139 LBS

## 2023-08-16 DIAGNOSIS — K76.0 FATTY LIVER: ICD-10-CM

## 2023-08-16 DIAGNOSIS — K74.69 CIRRHOSIS, CRYPTOGENIC: ICD-10-CM

## 2023-08-16 PROCEDURE — 99214 OFFICE O/P EST MOD 30 MIN: CPT

## 2023-08-16 RX ORDER — MULTIVITAMIN
1 TABLET TABLET ORAL ONCE A DAY
Status: ACTIVE | COMMUNITY

## 2023-08-16 RX ORDER — POTASSIUM CHLORIDE 1.5 G/1.58G
1 PACKET WITH FOOD POWDER, FOR SOLUTION ORAL ONCE A DAY
Status: ACTIVE | COMMUNITY

## 2023-08-16 RX ORDER — CHOLESTYRAMINE 4 G/9G
1 SCOOP POWDER, FOR SUSPENSION ORAL ONCE A DAY
COMMUNITY

## 2023-08-16 RX ORDER — NADOLOL 20 MG/1
2 TABLETS TABLET ORAL ONCE A DAY
Status: ACTIVE | COMMUNITY

## 2023-08-16 RX ORDER — FUROSEMIDE 20 MG/1
1 TABLET TABLET ORAL ONCE A DAY
Status: ACTIVE | COMMUNITY

## 2023-08-16 RX ORDER — FOLIC ACID 1 MG/1
1 TABLET TABLET ORAL ONCE A DAY
Status: ACTIVE | COMMUNITY

## 2023-08-16 RX ORDER — RIFAXIMIN 550 MG/1
TAKE 1 TABLET BY MOUTH TWICE (2) DAILY TABLET ORAL
Qty: 60 TABLET | Refills: 0 | Status: ACTIVE | COMMUNITY
End: 2023-10-19

## 2023-08-16 RX ORDER — SPIRONOLACTONE 25 MG/1
1 TABLET TABLET, FILM COATED ORAL ONCE A DAY
Status: ACTIVE | COMMUNITY

## 2023-08-16 RX ORDER — LACTULOSE 10 G/15ML
15 ML AS NEEDED SOLUTION ORAL
Qty: 1350 ML | Refills: 2 | Status: ACTIVE | COMMUNITY

## 2023-08-16 RX ORDER — BENAZEPRIL HYDROCHLORIDE 10 MG/1
1 TABLET TABLET, FILM COATED ORAL ONCE A DAY
Status: ACTIVE | COMMUNITY

## 2023-08-16 RX ORDER — PANTOPRAZOLE SODIUM 40 MG/1
1 TABLET TABLET, DELAYED RELEASE ORAL ONCE A DAY
Status: ACTIVE | COMMUNITY

## 2023-08-16 NOTE — HPI-TODAY'S VISIT:
Patient is a 64 yo f and last seen July 2023 and prior referred by Coleen Sánchez NP for an evaluation of fatty liver and cirrhosis and pse. A copy of this note will be sent to the referring provider.       8/16/23 tele she is still waiting on xifaxan and i reached out to adrienne discussed egd due in  soon in sept and also needs to be sure mri scheduled. Girard oltx ordreed She was needing dental clearance and conine t is tomorrow for oltx  she has appts for this.  she was offered samples but she [place] 7/13/23 telemed Was suppposed to be seen in nov but canceled and was not contacted to rebook by the office July 11, 2023 labs with INR 1.56, complete blood count with white blood cells 5.6, red blood cells 3.91, hemoglobin 9.3, platelets 136, MCV 92, AFP 4.2.  Sodium 133, potassium 3.8, creatinine up at 1.34, alkaline phosphatase 239, AST 91, ALT 47, bilirubin 5.6.  Previously on March 6 30th the bilirubin was 5.2, alkaline phosphatase 278,  and ALT 68.  3/2023 MRI EXAM: MR ABDOMEN W AND WO CONTRAST   CLINICAL INDICATION: Liver cirrhosis.   TECHNIQUE: Multisequence, multiplanar MRI of the abdomen was performed without and with intravenous contrast. ESRC.[id number]   COMPARISON: 4/5/2022   FINDINGS:  Lower Thorax: Normal.   Liver: No fat or iron. Cirrhotic multinodular liver with reticular fibrosis. No hepatocellular carcinoma.   Gallbladder/Biliary Tree: Status post cholecystectomy.   Spleen: Mildly enlarged measuring 14 cm in AP dimension.   Pancreas: Normal.   Adrenal Glands: Normal.    Kidneys/Ureters: Renal cysts.   Gastrointestinal: Susceptibility artifact in the upper abdomen using a GE junction limiting evaluation..    Lymph Nodes: Prominent periportal lymph nodes could be reactive.   Vessels: Dilated varicose IMV likely due to pelvic portacaval shunt. Small upper abdominal varices Patent portal vein.   Peritoneum/Retroperitoneum: Trace ascites.   Bones/Soft Tissues: Mild anasarca.   IMPRESSION: End-stage cirrhosis with stigmata of portal hypertension. No hepatocellular carcinoma. Patent portal vein.   MELD 24 and on transplant list she is doing labs in 2 weeks to recheck the creatine. they lowered the dos eof the aldactone to every other day she is out of xifaxan  taking lactulose 3 times a day, discussed need to lower this if having the diarrhea egd done MRI due  recap May visit with Cheli: Patient here for TH visit. she is on lactulose tid, having multiple bm's will send rx for xifaxan.   She did get it and she says it is helping with the memory.  Did labs today and will take a few days and [facility]  Prior due to MELD 21-she was sent to the transplant team, and they saw and we appreciate their input, MELD improved to 16, she is working on her diet and wanted her to be monitored.   They are planning to repeat imaging in Nov 15 and do labs and see Dr Khan then also.    She says she saw them already.  April 5 MRI shows lower thorax was normal. Liver had mild iron deposition. They did see morphologic changes of chronic liver disease with a diffusely nodular contour, small nodules, and caudate lobe hypertrophy. They did see a diffusely reticular pattern a bit lately but has been this compatible with extensive fibrosis.  They did not however see any suspicious liver lesions. The gallbladder was not well seen and may be absent.  No bile duct dilation seen. Spleen was top normal. Pancreas was normal. Adrenal glands were normal. Bilateral renal cysts were seen. Some prominent right cardiophrenic lymph nodes were seen measuring up to 1.9 x 1.0 cm. Extensive collateral vessels including esophageal varices, gastric wall varices and large pelvic collaterals were seen measuring up to 2.1 cm. Trace perihepatic ascites was seen. Overall they felt that you had signs of advanced chronic liver disease but with no suspicious lesions and they recommended a 6-month surveillance. They did see signs of portal hypertension including the esophageal varices, gastric varices and a large pelvic collaterals and trace perihepatic ascites.  I saw from the notes that you were pending an EGD surveillance and and she is doing that with local doctor for egd and colon.  feb 2022 labs na low 132, glucose 76, cr 1.04, albumin low 2.6. ammonia elevated 66. tb 5.7, ast 144, alt 71, alp 338. iron sat unable to calculate. ferritin elevated 839. f actin ab elevated 21, ama 6.3, smooth musc ab abnormal 1:20. immune to hep a. need hep b vaccine. hep c neg. alpha 1 MM.

## 2023-09-27 ENCOUNTER — LAB OUTSIDE AN ENCOUNTER (OUTPATIENT)
Dept: URBAN - METROPOLITAN AREA TELEHEALTH 2 | Facility: TELEHEALTH | Age: 63
End: 2023-09-27

## 2023-10-11 ENCOUNTER — OFFICE VISIT (OUTPATIENT)
Dept: URBAN - METROPOLITAN AREA TELEHEALTH 2 | Facility: TELEHEALTH | Age: 63
End: 2023-10-11
Payer: COMMERCIAL

## 2023-10-11 VITALS — BODY MASS INDEX: 23.73 KG/M2 | HEIGHT: 64 IN | WEIGHT: 139 LBS

## 2023-10-11 DIAGNOSIS — K76.0 FATTY LIVER: ICD-10-CM

## 2023-10-11 DIAGNOSIS — K76.82 HEPATIC ENCEPHALOPATHY: ICD-10-CM

## 2023-10-11 DIAGNOSIS — R18.8 OTHER ASCITES: ICD-10-CM

## 2023-10-11 DIAGNOSIS — K74.69 CIRRHOSIS, CRYPTOGENIC: ICD-10-CM

## 2023-10-11 DIAGNOSIS — G93.40 ENCEPHALOPATHY: ICD-10-CM

## 2023-10-11 PROCEDURE — 99214 OFFICE O/P EST MOD 30 MIN: CPT | Performed by: PHYSICIAN ASSISTANT

## 2023-10-11 RX ORDER — PANTOPRAZOLE SODIUM 40 MG/1
1 TABLET TABLET, DELAYED RELEASE ORAL ONCE A DAY
Status: ACTIVE | COMMUNITY

## 2023-10-11 RX ORDER — SPIRONOLACTONE 25 MG/1
1 TABLET TABLET, FILM COATED ORAL ONCE A DAY
Status: ACTIVE | COMMUNITY

## 2023-10-11 RX ORDER — RIFAXIMIN 550 MG/1
TAKE 1 TABLET BY MOUTH TWICE (2) DAILY TABLET ORAL
Qty: 60 TABLET | Refills: 0 | Status: ACTIVE | COMMUNITY
End: 2023-10-19

## 2023-10-11 RX ORDER — MULTIVITAMIN
1 TABLET TABLET ORAL ONCE A DAY
Status: ACTIVE | COMMUNITY

## 2023-10-11 RX ORDER — FOLIC ACID 1 MG/1
1 TABLET TABLET ORAL ONCE A DAY
Status: ACTIVE | COMMUNITY

## 2023-10-11 RX ORDER — LACTULOSE 10 G/15ML
15 ML AS NEEDED SOLUTION ORAL
Qty: 1350 ML | Refills: 2 | Status: ACTIVE | COMMUNITY

## 2023-10-11 RX ORDER — NADOLOL 20 MG/1
2 TABLETS TABLET ORAL ONCE A DAY
Status: ACTIVE | COMMUNITY

## 2023-10-11 RX ORDER — POTASSIUM CHLORIDE 1.5 G/1.58G
1 PACKET WITH FOOD POWDER, FOR SOLUTION ORAL ONCE A DAY
Status: ACTIVE | COMMUNITY

## 2023-10-11 RX ORDER — BENAZEPRIL HYDROCHLORIDE 10 MG/1
1 TABLET TABLET, FILM COATED ORAL ONCE A DAY
Status: ACTIVE | COMMUNITY

## 2023-10-11 RX ORDER — FUROSEMIDE 20 MG/1
1 TABLET TABLET ORAL ONCE A DAY
Status: ACTIVE | COMMUNITY

## 2023-10-11 RX ORDER — CHOLESTYRAMINE 4 G/9G
1 SCOOP POWDER, FOR SUSPENSION ORAL ONCE A DAY
COMMUNITY

## 2023-10-11 NOTE — HPI-TODAY'S VISIT:
Patient is a 64 yo f and last seen July 2023 and prior referred by Coleen Sánchez NP for an evaluation of fatty liver and cirrhosis and pse. A copy of this note will be sent to the referring provider.       10/11/23 telemed via [employer] Pt recently in hospital in beginning of oct due to lucy, hyponatremia, and MELD was 31. Taking lasix every other day and aldactone 25 mg.LUCY resolved with fluids and albumin. While admitted did MRI, cardiac PET to help with otlx evaluation. 10/5/23 labs with inr 2.4, wbc 2.6, rbc 2.99, hemoglobin 8.6, mcv 81, platelets 60, na 135, k 2.7, cr 1.18, bili 6.8, alp 135, ast 52, alt 29.  She is also taking folic acid  She is approved for oltx. has fu with them on 10/31MELD 25, MELD na 26 SHe is taking xifaxan and doing well with that   10/2/23 MRI  FINDINGS:  Lower Thorax: Normal. Liver: No fat or iron. Morphological changes of chronic liver disease characterized by contour nodularity, lobar redistribution, fissural widening and reticular fibrosis. In addition, there are numerous lesions throughout the liver demonstrating loss of signal on in phase compared to out of phase imaging consistent with siderotic nodules. Small focus of arterial enhancement measuring 7 mm in the hepatic dome (series 10, image 30) without definite correlate on additional sequences, may be perfusional. Otherwise, no focal suspicious hepatic lesion identified. Patent portal veins. Gallbladder/Biliary Tree: Status post cholecystectomy.&nbsp;Spleen: Normal.&nbsp;Pancreas: Normal.&nbsp;Adrenal Glands: Normal.Kidneys/Ureters: Redemonstration of scattered simple to mildly complex cysts. For instance, again seen is a right inferior renal pole exophytic hemorrhagic versus cyst with thin septation, measuring approximately 2.0 x 1.9 cm (series 8, image 62; previously 2.1 x 2.0 cm on examination dated 3/7/2023 and 1.8 x 1.8 cm on examination dated 4/5/2022)-Bosniak 2. No suspicious renal lesion or hydronephrosis.Gastrointestinal: Note is made of wall thickening involving the ascending colon, likely reflecting portal colopathy.  Lymph Nodes: Mildly prominent periportal lymph nodes, similar to prior. Vessels: Atherosclerotic disease of the abdominal aorta without aneurysmal dilatation. Patent hepatic vasculature. Redemonstrated of upper abdominal varices including perigastric, perisplenic, and along the anterior abdomen. Also again seen is a large collateral vessel extending into the portal vein and SMV confluence. Peritoneum/Retroperitoneum: Moderate volume ascites.Bones/Soft Tissues: Anasarca. Multilevel degenerative changes the spine IMPRESSION:1.  Morphologic changes of chronic liver disease with sequelae of portal hypertension including moderate volume ascites and upper abdominal varices. Patent portal veins.2.  Subcentimeter arterially enhancing hepatic dome lesion without correlate on other sequences, likely perfusional. Otherwise, no suspicious focal hepatic lesion identified. Recommend attention on follow-up.  recap 8/16/23 tele she is still waiting on xifaxan and i reached out to adrienne discussed egd due in  soon in sept and also needs to be sure mri scheduled. lamonte oltx ordreed She was needing dental clearance and connie t is tomorrow for oltx  she has appts for this.  she was offered samples but she lives very far 7/13/23 telemed Was suppposed to be seen in nov but canceled and was not contacted to rebook by the office July 11, 2023 labs with INR 1.56, complete blood count with white blood cells 5.6, red blood cells 3.91, hemoglobin 9.3, platelets 136, MCV 92, AFP 4.2.  Sodium 133, potassium 3.8, creatinine up at 1.34, alkaline phosphatase 239, AST 91, ALT 47, bilirubin 5.6.  Previously on March 6 30th the bilirubin was 5.2, alkaline phosphatase 278,  and ALT 68.  3/2023 MRI EXAM: MR ABDOMEN W AND WO CONTRAST   CLINICAL INDICATION: Liver cirrhosis.   TECHNIQUE: Multisequence, multiplanar MRI of the abdomen was performed without and with intravenous contrast. ESRC.2.7.3   COMPARISON: 4/5/2022   FINDINGS:  Lower Thorax: Normal.   Liver: No fat or iron. Cirrhotic multinodular liver with reticular fibrosis. No hepatocellular carcinoma.   Gallbladder/Biliary Tree: Status post cholecystectomy.   Spleen: Mildly enlarged measuring 14 cm in AP dimension.   Pancreas: Normal.   Adrenal Glands: Normal.    Kidneys/Ureters: Renal cysts.   Gastrointestinal: Susceptibility artifact in the upper abdomen using a GE junction limiting evaluation..    Lymph Nodes: Prominent periportal lymph nodes could be reactive.   Vessels: Dilated varicose IMV likely due to pelvic portacaval shunt. Small upper abdominal varices Patent portal vein.   Peritoneum/Retroperitoneum: Trace ascites.   Bones/Soft Tissues: Mild anasarca.   IMPRESSION: End-stage cirrhosis with stigmata of portal hypertension. No hepatocellular carcinoma. Patent portal vein.   MELD 24 and on transplant list she is doing labs in 2 weeks to recheck the creatine. they lowered the dos eof the aldactone to every other day she is out of xifaxan  taking lactulose 3 times a day, discussed need to lower this if having the diarrhea egd done MRI due  recap May visit with Cheli: Patient here for TH visit. she is on lactulose tid, having multiple bm's will send rx for xifaxan.   She did get it and she says it is helping with the memory.  Did labs today and will take a few days and [facility]  Prior due to MELD 21-she was sent to the transplant team, and they saw and we appreciate their input, MELD improved to 16, she is working on her diet and wanted her to be monitored.   They are planning to repeat imaging in Nov 15 and do labs and see Dr Khan then also.    She says she saw them already.  April 5 MRI shows lower thorax was normal. Liver had mild iron deposition. They did see morphologic changes of chronic liver disease with a diffusely nodular contour, small nodules, and caudate lobe hypertrophy. They did see a diffusely reticular pattern a bit lately but has been this compatible with extensive fibrosis.  They did not however see any suspicious liver lesions. The gallbladder was not well seen and may be absent.  No bile duct dilation seen. Spleen was top normal. Pancreas was normal. Adrenal glands were normal. Bilateral renal cysts were seen. Some prominent right cardiophrenic lymph nodes were seen measuring up to 1.9 x 1.0 cm. Extensive collateral vessels including esophageal varices, gastric wall varices and large pelvic collaterals were seen measuring up to 2.1 cm. Trace perihepatic ascites was seen. Overall they felt that you had signs of advanced chronic liver disease but with no suspicious lesions and they recommended a 6-month surveillance. They did see signs of portal hypertension including the esophageal varices, gastric varices and a large pelvic collaterals and trace perihepatic ascites.  I saw from the notes that you were pending an EGD surveillance and and she is doing that with local doctor for egd and colon.  feb 2022 labs na low 132, glucose 76, cr 1.04, albumin low 2.6. ammonia elevated 66. tb 5.7, ast 144, alt 71, alp 338. iron sat unable to calculate. ferritin elevated 839. f actin ab elevated 21, ama 6.3, smooth musc ab abnormal 1:20. immune to hep a. need hep b vaccine. hep c neg. alpha 1 MM.

## 2023-12-04 ENCOUNTER — LAB OUTSIDE AN ENCOUNTER (OUTPATIENT)
Dept: URBAN - METROPOLITAN AREA TELEHEALTH 2 | Facility: TELEHEALTH | Age: 63
End: 2023-12-04

## 2023-12-07 ENCOUNTER — OFFICE VISIT (OUTPATIENT)
Dept: URBAN - METROPOLITAN AREA TELEHEALTH 2 | Facility: TELEHEALTH | Age: 63
End: 2023-12-07

## 2023-12-07 ENCOUNTER — TELEPHONE ENCOUNTER (OUTPATIENT)
Dept: URBAN - METROPOLITAN AREA CLINIC 86 | Facility: CLINIC | Age: 63
End: 2023-12-07

## 2023-12-07 VITALS — WEIGHT: 138 LBS | BODY MASS INDEX: 23.56 KG/M2 | HEIGHT: 64 IN

## 2023-12-07 RX ORDER — LACTULOSE 10 G/15ML
15 ML AS NEEDED SOLUTION ORAL
Qty: 1350 ML | Refills: 2 | Status: ACTIVE | COMMUNITY

## 2023-12-07 RX ORDER — SPIRONOLACTONE 25 MG/1
1 TABLET TABLET, FILM COATED ORAL ONCE A DAY
Status: ACTIVE | COMMUNITY

## 2023-12-07 RX ORDER — BENAZEPRIL HYDROCHLORIDE 10 MG/1
1 TABLET TABLET, FILM COATED ORAL ONCE A DAY
Status: ACTIVE | COMMUNITY

## 2023-12-07 RX ORDER — FUROSEMIDE 20 MG/1
1 TABLET TABLET ORAL ONCE A DAY
Status: ACTIVE | COMMUNITY

## 2023-12-07 RX ORDER — NADOLOL 20 MG/1
2 TABLETS TABLET ORAL ONCE A DAY
Status: ACTIVE | COMMUNITY

## 2023-12-07 RX ORDER — MULTIVITAMIN
1 TABLET TABLET ORAL ONCE A DAY
Status: ACTIVE | COMMUNITY

## 2023-12-07 RX ORDER — PANTOPRAZOLE SODIUM 40 MG/1
1 TABLET TABLET, DELAYED RELEASE ORAL ONCE A DAY
Status: ACTIVE | COMMUNITY

## 2023-12-07 RX ORDER — POTASSIUM CHLORIDE 1.5 G/1.58G
1 PACKET WITH FOOD POWDER, FOR SOLUTION ORAL ONCE A DAY
Status: ACTIVE | COMMUNITY

## 2023-12-07 RX ORDER — CHOLESTYRAMINE 4 G/9G
1 SCOOP POWDER, FOR SUSPENSION ORAL ONCE A DAY
COMMUNITY

## 2023-12-07 RX ORDER — FOLIC ACID 1 MG/1
1 TABLET TABLET ORAL ONCE A DAY
Status: ACTIVE | COMMUNITY

## 2023-12-07 NOTE — HPI-TODAY'S VISIT:
Patient is a 64 yo f and last seen July 2023 and prior referred by Coleen Sánchez NP for an evaluation of fatty liver and cirrhosis and pse. A copy of this note will be sent to the referring provider.       10/11/23 telemed via [employer] Pt recently in hospital in beginning of oct due to lucy, hyponatremia, and MELD was 31. Taking lasix every other day and aldactone 25 mg.LUCY resolved with fluids and albumin. While admitted did MRI, cardiac PET to help with otlx evaluation. 10/5/23 labs with inr 2.4, wbc 2.6, rbc 2.99, hemoglobin 8.6, mcv 81, platelets 60, na 135, k 2.7, cr 1.18, bili 6.8, alp 135, ast 52, alt 29.  She is also taking folic acid  She is approved for oltx. has fu with them on 10/31MELD 25, MELD na 26 SHe is taking xifaxan and doing well with that   10/2/23 MRI  FINDINGS:  Lower Thorax: Normal. Liver: No fat or iron. Morphological changes of chronic liver disease characterized by contour nodularity, lobar redistribution, fissural widening and reticular fibrosis. In addition, there are numerous lesions throughout the liver demonstrating loss of signal on in phase compared to out of phase imaging consistent with siderotic nodules. Small focus of arterial enhancement measuring 7 mm in the hepatic dome (series 10, image 30) without definite correlate on additional sequences, may be perfusional. Otherwise, no focal suspicious hepatic lesion identified. Patent portal veins. Gallbladder/Biliary Tree: Status post cholecystectomy.&nbsp;Spleen: Normal.&nbsp;Pancreas: Normal.&nbsp;Adrenal Glands: Normal.Kidneys/Ureters: Redemonstration of scattered simple to mildly complex cysts. For instance, again seen is a right inferior renal pole exophytic hemorrhagic versus cyst with thin septation, measuring approximately 2.0 x 1.9 cm (series 8, image 62; previously 2.1 x 2.0 cm on examination dated 3/7/2023 and 1.8 x 1.8 cm on examination dated 4/5/2022)-Bosniak 2. No suspicious renal lesion or hydronephrosis.Gastrointestinal: Note is made of wall thickening involving the ascending colon, likely reflecting portal colopathy.  Lymph Nodes: Mildly prominent periportal lymph nodes, similar to prior. Vessels: Atherosclerotic disease of the abdominal aorta without aneurysmal dilatation. Patent hepatic vasculature. Redemonstrated of upper abdominal varices including perigastric, perisplenic, and along the anterior abdomen. Also again seen is a large collateral vessel extending into the portal vein and SMV confluence. Peritoneum/Retroperitoneum: Moderate volume ascites.Bones/Soft Tissues: Anasarca. Multilevel degenerative changes the spine IMPRESSION:1.  Morphologic changes of chronic liver disease with sequelae of portal hypertension including moderate volume ascites and upper abdominal varices. Patent portal veins.2.  Subcentimeter arterially enhancing hepatic dome lesion without correlate on other sequences, likely perfusional. Otherwise, no suspicious focal hepatic lesion identified. Recommend attention on follow-up.  recap 8/16/23 tele she is still waiting on xifaxan and i reached out to adrienne discussed egd due in  soon in sept and also needs to be sure mri scheduled. lamonte oltx ordreed She was needing dental clearance and connie t is tomorrow for oltx  she has appts for this.  she was offered samples but she lives very far 7/13/23 telemed Was suppposed to be seen in nov but canceled and was not contacted to rebook by the office July 11, 2023 labs with INR 1.56, complete blood count with white blood cells 5.6, red blood cells 3.91, hemoglobin 9.3, platelets 136, MCV 92, AFP 4.2.  Sodium 133, potassium 3.8, creatinine up at 1.34, alkaline phosphatase 239, AST 91, ALT 47, bilirubin 5.6.  Previously on March 6 30th the bilirubin was 5.2, alkaline phosphatase 278,  and ALT 68.  3/2023 MRI EXAM: MR ABDOMEN W AND WO CONTRAST   CLINICAL INDICATION: Liver cirrhosis.   TECHNIQUE: Multisequence, multiplanar MRI of the abdomen was performed without and with intravenous contrast. ESRC.2.7.3   COMPARISON: 4/5/2022   FINDINGS:  Lower Thorax: Normal.   Liver: No fat or iron. Cirrhotic multinodular liver with reticular fibrosis. No hepatocellular carcinoma.   Gallbladder/Biliary Tree: Status post cholecystectomy.   Spleen: Mildly enlarged measuring 14 cm in AP dimension.   Pancreas: Normal.   Adrenal Glands: Normal.    Kidneys/Ureters: Renal cysts.   Gastrointestinal: Susceptibility artifact in the upper abdomen using a GE junction limiting evaluation..    Lymph Nodes: Prominent periportal lymph nodes could be reactive.   Vessels: Dilated varicose IMV likely due to pelvic portacaval shunt. Small upper abdominal varices Patent portal vein.   Peritoneum/Retroperitoneum: Trace ascites.   Bones/Soft Tissues: Mild anasarca.   IMPRESSION: End-stage cirrhosis with stigmata of portal hypertension. No hepatocellular carcinoma. Patent portal vein.   MELD 24 and on transplant list she is doing labs in 2 weeks to recheck the creatine. they lowered the dos eof the aldactone to every other day she is out of xifaxan  taking lactulose 3 times a day, discussed need to lower this if having the diarrhea egd done MRI due  recap May visit with Cheli: Patient here for TH visit. she is on lactulose tid, having multiple bm's will send rx for xifaxan.   She did get it and she says it is helping with the memory.  Did labs today and will take a few days and [facility]  Prior due to MELD 21-she was sent to the transplant team, and they saw and we appreciate their input, MELD improved to 16, she is working on her diet and wanted her to be monitored.   They are planning to repeat imaging in Nov 15 and do labs and see Dr Khan then also.    She says she saw them already.  April 5 MRI shows lower thorax was normal. Liver had mild iron deposition. They did see morphologic changes of chronic liver disease with a diffusely nodular contour, small nodules, and caudate lobe hypertrophy. They did see a diffusely reticular pattern a bit lately but has been this compatible with extensive fibrosis.  They did not however see any suspicious liver lesions. The gallbladder was not well seen and may be absent.  No bile duct dilation seen. Spleen was top normal. Pancreas was normal. Adrenal glands were normal. Bilateral renal cysts were seen. Some prominent right cardiophrenic lymph nodes were seen measuring up to 1.9 x 1.0 cm. Extensive collateral vessels including esophageal varices, gastric wall varices and large pelvic collaterals were seen measuring up to 2.1 cm. Trace perihepatic ascites was seen. Overall they felt that you had signs of advanced chronic liver disease but with no suspicious lesions and they recommended a 6-month surveillance. They did see signs of portal hypertension including the esophageal varices, gastric varices and a large pelvic collaterals and trace perihepatic ascites.  I saw from the notes that you were pending an EGD surveillance and and she is doing that with local doctor for egd and colon.  feb 2022 labs na low 132, glucose 76, cr 1.04, albumin low 2.6. ammonia elevated 66. tb 5.7, ast 144, alt 71, alp 338. iron sat unable to calculate. ferritin elevated 839. f actin ab elevated 21, ama 6.3, smooth musc ab abnormal 1:20. immune to hep a. need hep b vaccine. hep c neg. alpha 1 MM.

## 2023-12-19 ENCOUNTER — TELEPHONE ENCOUNTER (OUTPATIENT)
Dept: URBAN - METROPOLITAN AREA CLINIC 92 | Facility: CLINIC | Age: 63
End: 2023-12-19

## 2023-12-19 RX ORDER — RIFAXIMIN 550 MG/1
TAKE 1 TABLET BY MOUTH TWICE (2) DAILY TABLET ORAL
Qty: 180 | Refills: 0

## 2024-01-04 ENCOUNTER — OFFICE VISIT (OUTPATIENT)
Dept: URBAN - METROPOLITAN AREA TELEHEALTH 2 | Facility: TELEHEALTH | Age: 64
End: 2024-01-04

## 2024-01-04 ENCOUNTER — TELEPHONE ENCOUNTER (OUTPATIENT)
Dept: URBAN - METROPOLITAN AREA CLINIC 86 | Facility: CLINIC | Age: 64
End: 2024-01-04

## 2024-01-04 RX ORDER — FOLIC ACID 1 MG/1
1 TABLET TABLET ORAL ONCE A DAY
Status: ACTIVE | COMMUNITY

## 2024-01-04 RX ORDER — PANTOPRAZOLE SODIUM 40 MG/1
1 TABLET TABLET, DELAYED RELEASE ORAL ONCE A DAY
Status: ACTIVE | COMMUNITY

## 2024-01-04 RX ORDER — NADOLOL 20 MG/1
2 TABLETS TABLET ORAL ONCE A DAY
Status: ACTIVE | COMMUNITY

## 2024-01-04 RX ORDER — LACTULOSE 10 G/15ML
15 ML AS NEEDED SOLUTION ORAL
Qty: 1350 ML | Refills: 2 | Status: ACTIVE | COMMUNITY

## 2024-01-04 RX ORDER — FUROSEMIDE 20 MG/1
1 TABLET TABLET ORAL ONCE A DAY
Status: ACTIVE | COMMUNITY

## 2024-01-04 RX ORDER — CHOLESTYRAMINE 4 G/9G
1 SCOOP POWDER, FOR SUSPENSION ORAL ONCE A DAY
COMMUNITY

## 2024-01-04 RX ORDER — BENAZEPRIL HYDROCHLORIDE 10 MG/1
1 TABLET TABLET, FILM COATED ORAL ONCE A DAY
Status: ACTIVE | COMMUNITY

## 2024-01-04 RX ORDER — RIFAXIMIN 550 MG/1
TAKE 1 TABLET BY MOUTH TWICE (2) DAILY TABLET ORAL
Qty: 180 | Refills: 0 | Status: ACTIVE | COMMUNITY

## 2024-01-04 RX ORDER — MULTIVITAMIN
1 TABLET TABLET ORAL ONCE A DAY
Status: ACTIVE | COMMUNITY

## 2024-01-04 RX ORDER — POTASSIUM CHLORIDE 1.5 G/1.58G
1 PACKET WITH FOOD POWDER, FOR SOLUTION ORAL ONCE A DAY
Status: ACTIVE | COMMUNITY

## 2024-01-04 RX ORDER — SPIRONOLACTONE 25 MG/1
1 TABLET TABLET, FILM COATED ORAL ONCE A DAY
Status: ACTIVE | COMMUNITY

## 2024-02-27 ENCOUNTER — TELEP (OUTPATIENT)
Dept: URBAN - METROPOLITAN AREA TELEHEALTH 2 | Facility: TELEHEALTH | Age: 64
End: 2024-02-27

## 2024-02-27 RX ORDER — LACTULOSE 10 G/15ML
15 ML AS NEEDED SOLUTION ORAL
Qty: 1350 ML | Refills: 2 | Status: ACTIVE | COMMUNITY

## 2024-02-27 RX ORDER — MULTIVITAMIN
1 TABLET TABLET ORAL ONCE A DAY
Status: ACTIVE | COMMUNITY

## 2024-02-27 RX ORDER — FUROSEMIDE 20 MG/1
1 TABLET TABLET ORAL ONCE A DAY
Status: ACTIVE | COMMUNITY

## 2024-02-27 RX ORDER — PANTOPRAZOLE SODIUM 40 MG/1
1 TABLET TABLET, DELAYED RELEASE ORAL ONCE A DAY
Status: ACTIVE | COMMUNITY

## 2024-02-27 RX ORDER — FOLIC ACID 1 MG/1
1 TABLET TABLET ORAL ONCE A DAY
Status: ACTIVE | COMMUNITY

## 2024-02-27 RX ORDER — SPIRONOLACTONE 25 MG/1
1 TABLET TABLET, FILM COATED ORAL ONCE A DAY
Status: ACTIVE | COMMUNITY

## 2024-02-27 RX ORDER — CHOLESTYRAMINE 4 G/9G
1 SCOOP POWDER, FOR SUSPENSION ORAL ONCE A DAY
COMMUNITY

## 2024-02-27 RX ORDER — RIFAXIMIN 550 MG/1
TAKE 1 TABLET BY MOUTH TWICE (2) DAILY TABLET ORAL
Qty: 180 | Refills: 0 | Status: ACTIVE | COMMUNITY

## 2024-02-27 RX ORDER — NADOLOL 20 MG/1
2 TABLETS TABLET ORAL ONCE A DAY
Status: ACTIVE | COMMUNITY

## 2024-02-27 RX ORDER — BENAZEPRIL HYDROCHLORIDE 10 MG/1
1 TABLET TABLET, FILM COATED ORAL ONCE A DAY
Status: ACTIVE | COMMUNITY

## 2024-02-27 RX ORDER — POTASSIUM CHLORIDE 1.5 G/1.58G
1 PACKET WITH FOOD POWDER, FOR SOLUTION ORAL ONCE A DAY
Status: ACTIVE | COMMUNITY

## 2024-02-27 NOTE — HPI-TODAY'S VISIT:
Patient is a 62 yo f and last seen July 2023 and prior referred by Coleen Sánchez NP for an evaluation of fatty liver and cirrhosis and pse. A copy of this note will be sent to the referring provider.       10/11/23 telemed via [employer] Pt recently in hospital in beginning of oct due to lucy, hyponatremia, and MELD was 31. Taking lasix every other day and aldactone 25 mg.LUCY resolved with fluids and albumin. While admitted did MRI, cardiac PET to help with otlx evaluation. 10/5/23 labs with inr 2.4, wbc 2.6, rbc 2.99, hemoglobin 8.6, mcv 81, platelets 60, na 135, k 2.7, cr 1.18, bili 6.8, alp 135, ast 52, alt 29.  She is also taking folic acid  She is approved for oltx. has fu with them on 10/31MELD 25, MELD na 26 SHe is taking xifaxan and doing well with that   10/2/23 MRI  FINDINGS:  Lower Thorax: Normal. Liver: No fat or iron. Morphological changes of chronic liver disease characterized by contour nodularity, lobar redistribution, fissural widening and reticular fibrosis. In addition, there are numerous lesions throughout the liver demonstrating loss of signal on in phase compared to out of phase imaging consistent with siderotic nodules. Small focus of arterial enhancement measuring 7 mm in the hepatic dome (series 10, image 30) without definite correlate on additional sequences, may be perfusional. Otherwise, no focal suspicious hepatic lesion identified. Patent portal veins. Gallbladder/Biliary Tree: Status post cholecystectomy.&nbsp;Spleen: Normal.&nbsp;Pancreas: Normal.&nbsp;Adrenal Glands: Normal.Kidneys/Ureters: Redemonstration of scattered simple to mildly complex cysts. For instance, again seen is a right inferior renal pole exophytic hemorrhagic versus cyst with thin septation, measuring approximately 2.0 x 1.9 cm (series 8, image 62; previously 2.1 x 2.0 cm on examination dated 3/7/2023 and 1.8 x 1.8 cm on examination dated 4/5/2022)-Bosniak 2. No suspicious renal lesion or hydronephrosis.Gastrointestinal: Note is made of wall thickening involving the ascending colon, likely reflecting portal colopathy.  Lymph Nodes: Mildly prominent periportal lymph nodes, similar to prior. Vessels: Atherosclerotic disease of the abdominal aorta without aneurysmal dilatation. Patent hepatic vasculature. Redemonstrated of upper abdominal varices including perigastric, perisplenic, and along the anterior abdomen. Also again seen is a large collateral vessel extending into the portal vein and SMV confluence. Peritoneum/Retroperitoneum: Moderate volume ascites.Bones/Soft Tissues: Anasarca. Multilevel degenerative changes the spine IMPRESSION:1.  Morphologic changes of chronic liver disease with sequelae of portal hypertension including moderate volume ascites and upper abdominal varices. Patent portal veins.2.  Subcentimeter arterially enhancing hepatic dome lesion without correlate on other sequences, likely perfusional. Otherwise, no suspicious focal hepatic lesion identified. Recommend attention on follow-up.  recap 8/16/23 tele she is still waiting on xifaxan and i reached out to adrienne discussed egd due in  soon in sept and also needs to be sure mri scheduled. lamonte oltx ordreed She was needing dental clearance and connie t is tomorrow for oltx  she has appts for this.  she was offered samples but she lives very far 7/13/23 telemed Was suppposed to be seen in nov but canceled and was not contacted to rebook by the office July 11, 2023 labs with INR 1.56, complete blood count with white blood cells 5.6, red blood cells 3.91, hemoglobin 9.3, platelets 136, MCV 92, AFP 4.2.  Sodium 133, potassium 3.8, creatinine up at 1.34, alkaline phosphatase 239, AST 91, ALT 47, bilirubin 5.6.  Previously on March 6 30th the bilirubin was 5.2, alkaline phosphatase 278,  and ALT 68.  3/2023 MRI EXAM: MR ABDOMEN W AND WO CONTRAST   CLINICAL INDICATION: Liver cirrhosis.   TECHNIQUE: Multisequence, multiplanar MRI of the abdomen was performed without and with intravenous contrast. ESRC.2.7.3   COMPARISON: 4/5/2022   FINDINGS:  Lower Thorax: Normal.   Liver: No fat or iron. Cirrhotic multinodular liver with reticular fibrosis. No hepatocellular carcinoma.   Gallbladder/Biliary Tree: Status post cholecystectomy.   Spleen: Mildly enlarged measuring 14 cm in AP dimension.   Pancreas: Normal.   Adrenal Glands: Normal.    Kidneys/Ureters: Renal cysts.   Gastrointestinal: Susceptibility artifact in the upper abdomen using a GE junction limiting evaluation..    Lymph Nodes: Prominent periportal lymph nodes could be reactive.   Vessels: Dilated varicose IMV likely due to pelvic portacaval shunt. Small upper abdominal varices Patent portal vein.   Peritoneum/Retroperitoneum: Trace ascites.   Bones/Soft Tissues: Mild anasarca.   IMPRESSION: End-stage cirrhosis with stigmata of portal hypertension. No hepatocellular carcinoma. Patent portal vein.   MELD 24 and on transplant list she is doing labs in 2 weeks to recheck the creatine. they lowered the dos eof the aldactone to every other day she is out of xifaxan  taking lactulose 3 times a day, discussed need to lower this if having the diarrhea egd done MRI due  recap May visit with Cheli: Patient here for TH visit. she is on lactulose tid, having multiple bm's will send rx for xifaxan.   She did get it and she says it is helping with the memory.  Did labs today and will take a few days and [facility]  Prior due to MELD 21-she was sent to the transplant team, and they saw and we appreciate their input, MELD improved to 16, she is working on her diet and wanted her to be monitored.   They are planning to repeat imaging in Nov 15 and do labs and see Dr Khan then also.    She says she saw them already.  April 5 MRI shows lower thorax was normal. Liver had mild iron deposition. They did see morphologic changes of chronic liver disease with a diffusely nodular contour, small nodules, and caudate lobe hypertrophy. They did see a diffusely reticular pattern a bit lately but has been this compatible with extensive fibrosis.  They did not however see any suspicious liver lesions. The gallbladder was not well seen and may be absent.  No bile duct dilation seen. Spleen was top normal. Pancreas was normal. Adrenal glands were normal. Bilateral renal cysts were seen. Some prominent right cardiophrenic lymph nodes were seen measuring up to 1.9 x 1.0 cm. Extensive collateral vessels including esophageal varices, gastric wall varices and large pelvic collaterals were seen measuring up to 2.1 cm. Trace perihepatic ascites was seen. Overall they felt that you had signs of advanced chronic liver disease but with no suspicious lesions and they recommended a 6-month surveillance. They did see signs of portal hypertension including the esophageal varices, gastric varices and a large pelvic collaterals and trace perihepatic ascites.  I saw from the notes that you were pending an EGD surveillance and and she is doing that with local doctor for egd and colon.  feb 2022 labs na low 132, glucose 76, cr 1.04, albumin low 2.6. ammonia elevated 66. tb 5.7, ast 144, alt 71, alp 338. iron sat unable to calculate. ferritin elevated 839. f actin ab elevated 21, ama 6.3, smooth musc ab abnormal 1:20. immune to hep a. need hep b vaccine. hep c neg. alpha 1 MM.

## 2024-03-13 ENCOUNTER — TELEP (OUTPATIENT)
Dept: URBAN - METROPOLITAN AREA TELEHEALTH 2 | Facility: TELEHEALTH | Age: 64
End: 2024-03-13
Payer: COMMERCIAL

## 2024-03-13 VITALS — BODY MASS INDEX: 22.2 KG/M2 | WEIGHT: 130 LBS | HEIGHT: 64 IN

## 2024-03-13 DIAGNOSIS — R18.8 OTHER ASCITES: ICD-10-CM

## 2024-03-13 DIAGNOSIS — K76.0 FATTY LIVER: ICD-10-CM

## 2024-03-13 DIAGNOSIS — G93.40 ENCEPHALOPATHY: ICD-10-CM

## 2024-03-13 DIAGNOSIS — K74.69 CIRRHOSIS, CRYPTOGENIC: ICD-10-CM

## 2024-03-13 PROBLEM — 161671001: Status: ACTIVE | Noted: 2024-03-13

## 2024-03-13 PROCEDURE — 99214 OFFICE O/P EST MOD 30 MIN: CPT | Performed by: PHYSICIAN ASSISTANT

## 2024-03-13 RX ORDER — SULFAMETHOXAZOLE AND TRIMETHOPRIM 400; 80 MG/1; MG/1
1 TABLET TABLET ORAL ONCE A DAY
Status: ACTIVE | COMMUNITY

## 2024-03-13 RX ORDER — LACTULOSE 10 G/15ML
15 ML AS NEEDED SOLUTION ORAL
Qty: 1350 ML | Refills: 2 | Status: ON HOLD | COMMUNITY

## 2024-03-13 RX ORDER — RIFAXIMIN 550 MG/1
TAKE 1 TABLET BY MOUTH TWICE (2) DAILY TABLET ORAL
Qty: 180 | Refills: 0 | Status: ON HOLD | COMMUNITY

## 2024-03-13 RX ORDER — BENAZEPRIL HYDROCHLORIDE 10 MG/1
1 TABLET TABLET, FILM COATED ORAL ONCE A DAY
Status: ON HOLD | COMMUNITY

## 2024-03-13 RX ORDER — TACROLIMUS 5 MG/1
AS DIRECTED CAPSULE, GELATIN COATED ORAL
Status: ACTIVE | COMMUNITY

## 2024-03-13 RX ORDER — ACYCLOVIR 400 MG/1
1 TABLET TABLET ORAL TWICE A DAY
Status: ACTIVE | COMMUNITY

## 2024-03-13 RX ORDER — PANTOPRAZOLE SODIUM 40 MG/1
1 TABLET TABLET, DELAYED RELEASE ORAL ONCE A DAY
Status: ACTIVE | COMMUNITY

## 2024-03-13 RX ORDER — FOLIC ACID 1 MG/1
1 TABLET TABLET ORAL ONCE A DAY
Status: ON HOLD | COMMUNITY

## 2024-03-13 RX ORDER — SPIRONOLACTONE 25 MG/1
1 TABLET TABLET, FILM COATED ORAL ONCE A DAY
Status: ON HOLD | COMMUNITY

## 2024-03-13 RX ORDER — PREDNISONE 5 MG/1
1 TABLET TABLET ORAL ONCE A DAY
Status: ACTIVE | COMMUNITY

## 2024-03-13 RX ORDER — TACROLIMUS 1 MG/1
AS DIRECTED GRANULE, FOR SUSPENSION ORAL
Status: ACTIVE | COMMUNITY

## 2024-03-13 RX ORDER — MAGNESIUM GLYCINATE 100 MG
AS DIRECTED CAPSULE ORAL
Status: ACTIVE | COMMUNITY

## 2024-03-13 RX ORDER — NADOLOL 20 MG/1
2 TABLETS TABLET ORAL ONCE A DAY
Status: ON HOLD | COMMUNITY

## 2024-03-13 RX ORDER — CHOLESTYRAMINE 4 G/9G
1 SCOOP POWDER, FOR SUSPENSION ORAL ONCE A DAY
COMMUNITY

## 2024-03-13 RX ORDER — MYCOPHENOLATE MOFETIL 500 MG/1
1 TABLET TABLET, FILM COATED ORAL TWICE A DAY
Status: ACTIVE | COMMUNITY

## 2024-03-13 RX ORDER — VALGANCICLOVIR 450 MG/1
1 TABLET WITH A MEAL TABLET, FILM COATED ORAL ONCE A DAY
Status: ACTIVE | COMMUNITY

## 2024-03-13 RX ORDER — POTASSIUM CHLORIDE 1.5 G/1.58G
1 PACKET WITH FOOD POWDER, FOR SOLUTION ORAL ONCE A DAY
Status: ON HOLD | COMMUNITY

## 2024-03-13 RX ORDER — MULTIVITAMIN
1 TABLET TABLET ORAL ONCE A DAY
Status: ACTIVE | COMMUNITY

## 2024-03-13 RX ORDER — FUROSEMIDE 20 MG/1
1 TABLET TABLET ORAL ONCE A DAY
Status: ON HOLD | COMMUNITY

## 2024-03-13 RX ORDER — NYSTATIN 100000 [USP'U]/ML
4 ML SUSPENSION ORAL
Status: ACTIVE | COMMUNITY

## 2024-03-13 NOTE — PHYSICAL EXAM CHEST:
What Type Of Note Output Would You Prefer (Optional)?: Bullet Format breathing is unlabored without accessory muscle use. Hpi Title: Evaluation of Skin Lesions

## 2024-03-13 NOTE — HPI-TODAY'S VISIT:
Patient is a 62 yo f and last seen Oct 2023 and prior referred by Coleen Sánchez NP for an evaluation of fatty liver and cirrhosis and pse. A copy of this note will be sent to the referring provider.        3/13/24 telemed she had oltx 12/21/23 she had became very disoriented and had severe HE and meld was up so did get the oltx. MELD 39 at that time.  2/28/24 labs with INR 1.2, white blood cells 2.7, red blood cells 3.01, hemoglobin 8.9, MCV 90, platelets 287.Sodium 138, potassium 3.7, creatinine 2.3, bilirubin 0.8, alkaline phosphatase 53, AST 16, ALT 6.  Tacrolimus level 5.8. reviewed her meds and updated in chart. to note she is tapering off prednisone still  12/21/23 US ABDOMEN DOPPLER COMPLETE, US ABDOMEN LIMITED CLINICAL INDICATION: Liver Transplant. TECHNIQUE: Grayscale, pulsed wave and color Doppler sonography of the right upper quadrant were performed. ESRC.3.7.2 COMPARISON: None. FINDINGS:  Liver: Normal echogenicity. No lesions. Bile Ducts: No dilated intrahepatic biliary radicles. Common duct measures 5 mm. Pancreas: Normal. Right Kidney: Measures 9.1 cm. Normal echogenicity. No hydronephrosis. Aorta: Normal. IVC: Normal. Hepatic Veins: Normal. Portal Veins: Hepatopetal flow. Velocity prior to the anastomosis 57.6 cm/s. Velocity at the anastomosis 114-125 cm/s. Velocity distal to the anastomosis 130-141 cm/s. Hepatic Arteries: Peak systolic velocity 218 cm/s. Resistive index 0.69-0.71. The right and left hepatic arteries have normal waveform and resistive index. Other: None. IMPRESSION:Status post liver transplant with patent vasculature. Focally increased peak systolic velocity at the hepatic arterial anastomosis of 218 cm/s, which could be due to postoperative edema. The right and left hepatic arteries have normal waveforms and resistive indexes. Short-term follow-up is recommended.   Discussed will cont to follow along  stresesd importance of labs    recap  10/11/23 telemed via [employer] Pt recently in hospital in beginning of oct due to lucy, hyponatremia, and MELD was 31. Taking lasix every other day and aldactone 25 mg.LUCY resolved with fluids and albumin. While admitted did MRI, cardiac PET to help with otlx evaluation. 10/5/23 labs with inr 2.4, wbc 2.6, rbc 2.99, hemoglobin 8.6, mcv 81, platelets 60, na 135, k 2.7, cr 1.18, bili 6.8, alp 135, ast 52, alt 29.  She is also taking folic acid  She is approved for oltx. has fu with them on 10/31MELD 25, MELD na 26 SHe is taking xifaxan and doing well with that   10/2/23 MRI  FINDINGS:  Lower Thorax: Normal. Liver: No fat or iron. Morphological changes of chronic liver disease characterized by contour nodularity, lobar redistribution, fissural widening and reticular fibrosis. In addition, there are numerous lesions throughout the liver demonstrating loss of signal on in phase compared to out of phase imaging consistent with siderotic nodules. Small focus of arterial enhancement measuring 7 mm in the hepatic dome (series 10, image 30) without definite correlate on additional sequences, may be perfusional. Otherwise, no focal suspicious hepatic lesion identified. Patent portal veins. Gallbladder/Biliary Tree: Status post cholecystectomy.&nbsp;Spleen: Normal.&nbsp;Pancreas: Normal.&nbsp;Adrenal Glands: Normal.Kidneys/Ureters: Redemonstration of scattered simple to mildly complex cysts. For instance, again seen is a right inferior renal pole exophytic hemorrhagic versus cyst with thin septation, measuring approximately 2.0 x 1.9 cm (series 8, image 62; previously 2.1 x 2.0 cm on examination dated 3/7/2023 and 1.8 x 1.8 cm on examination dated 4/5/2022)-Bosniak 2. No suspicious renal lesion or hydronephrosis.Gastrointestinal: Note is made of wall thickening involving the ascending colon, likely reflecting portal colopathy.  Lymph Nodes: Mildly prominent periportal lymph nodes, similar to prior. Vessels: Atherosclerotic disease of the abdominal aorta without aneurysmal dilatation. Patent hepatic vasculature. Redemonstrated of upper abdominal varices including perigastric, perisplenic, and along the anterior abdomen. Also again seen is a large collateral vessel extending into the portal vein and SMV confluence. Peritoneum/Retroperitoneum: Moderate volume ascites.Bones/Soft Tissues: Anasarca. Multilevel degenerative changes the spine IMPRESSION:1.  Morphologic changes of chronic liver disease with sequelae of portal hypertension including moderate volume ascites and upper abdominal varices. Patent portal veins.2.  Subcentimeter arterially enhancing hepatic dome lesion without correlate on other sequences, likely perfusional. Otherwise, no suspicious focal hepatic lesion identified. Recommend attention on follow-up.  8/16/23 tele she is still waiting on xifaxan and i reached out to adrienne discussed egd due in  soon in sept and also needs to be sure mri scheduled. lamonte oltx ordreed She was needing dental clearance and connie t is tomorrow for oltx  she has appts for this.  she was offered samples but she lives very far 7/13/23 telemed Was suppposed to be seen in nov but canceled and was not contacted to rebook by the office July 11, 2023 labs with INR 1.56, complete blood count with white blood cells 5.6, red blood cells 3.91, hemoglobin 9.3, platelets 136, MCV 92, AFP 4.2.  Sodium 133, potassium 3.8, creatinine up at 1.34, alkaline phosphatase 239, AST 91, ALT 47, bilirubin 5.6.  Previously on March 6 30th the bilirubin was 5.2, alkaline phosphatase 278,  and ALT 68.  3/2023 MRI EXAM: MR ABDOMEN W AND WO CONTRAST   CLINICAL INDICATION: Liver cirrhosis.   TECHNIQUE: Multisequence, multiplanar MRI of the abdomen was performed without and with intravenous contrast. ESRC.2.7.3   COMPARISON: 4/5/2022   FINDINGS:  Lower Thorax: Normal.   Liver: No fat or iron. Cirrhotic multinodular liver with reticular fibrosis. No hepatocellular carcinoma.   Gallbladder/Biliary Tree: Status post cholecystectomy.   Spleen: Mildly enlarged measuring 14 cm in AP dimension.   Pancreas: Normal.   Adrenal Glands: Normal.    Kidneys/Ureters: Renal cysts.   Gastrointestinal: Susceptibility artifact in the upper abdomen using a GE junction limiting evaluation..    Lymph Nodes: Prominent periportal lymph nodes could be reactive.   Vessels: Dilated varicose IMV likely due to pelvic portacaval shunt. Small upper abdominal varices Patent portal vein.   Peritoneum/Retroperitoneum: Trace ascites.   Bones/Soft Tissues: Mild anasarca.   IMPRESSION: End-stage cirrhosis with stigmata of portal hypertension. No hepatocellular carcinoma. Patent portal vein.   MELD 24 and on transplant list she is doing labs in 2 weeks to recheck the creatine. they lowered the dos eof the aldactone to every other day she is out of xifaxan  taking lactulose 3 times a day, discussed need to lower this if having the diarrhea egd done MRI due  recap May visit with Cheli: Patient here for TH visit. she is on lactulose tid, having multiple bm's will send rx for xifaxan.   She did get it and she says it is helping with the memory.  Did labs today and will take a few days and [facility].  Prior due to MELD 21-she was sent to the transplant team, and they saw and we appreciate their input, MELD improved to 16, she is working on her diet and wanted her to be monitored.   They are planning to repeat imaging in Nov 15 and do labs and see Dr Khan then also.    She says she saw them already.  April 5 MRI shows lower thorax was normal. Liver had mild iron deposition. They did see morphologic changes of chronic liver disease with a diffusely nodular contour, small nodules, and caudate lobe hypertrophy. They did see a diffusely reticular pattern a bit lately but has been this compatible with extensive fibrosis.  They did not however see any suspicious liver lesions. The gallbladder was not well seen and may be absent.  No bile duct dilation seen. Spleen was top normal. Pancreas was normal. Adrenal glands were normal. Bilateral renal cysts were seen. Some prominent right cardiophrenic lymph nodes were seen measuring up to 1.9 x 1.0 cm. Extensive collateral vessels including esophageal varices, gastric wall varices and large pelvic collaterals were seen measuring up to 2.1 cm. Trace perihepatic ascites was seen. Overall they felt that you had signs of advanced chronic liver disease but with no suspicious lesions and they recommended a 6-month surveillance. They did see signs of portal hypertension including the esophageal varices, gastric varices and a large pelvic collaterals and trace perihepatic ascites.

## 2024-09-04 ENCOUNTER — OFFICE VISIT (OUTPATIENT)
Dept: URBAN - METROPOLITAN AREA TELEHEALTH 2 | Facility: TELEHEALTH | Age: 64
End: 2024-09-04

## 2024-09-04 RX ORDER — CHOLESTYRAMINE 4 G/9G
1 SCOOP POWDER, FOR SUSPENSION ORAL ONCE A DAY
COMMUNITY

## 2024-09-04 RX ORDER — ACYCLOVIR 400 MG/1
1 TABLET TABLET ORAL TWICE A DAY
Status: ACTIVE | COMMUNITY

## 2024-09-04 RX ORDER — MAGNESIUM GLYCINATE 100 MG
AS DIRECTED CAPSULE ORAL
Status: ACTIVE | COMMUNITY

## 2024-09-04 RX ORDER — PANTOPRAZOLE SODIUM 40 MG/1
1 TABLET TABLET, DELAYED RELEASE ORAL ONCE A DAY
Status: ACTIVE | COMMUNITY

## 2024-09-04 RX ORDER — FOLIC ACID 1 MG/1
1 TABLET TABLET ORAL ONCE A DAY
Status: ON HOLD | COMMUNITY

## 2024-09-04 RX ORDER — MYCOPHENOLATE MOFETIL 500 MG/1
1 TABLET TABLET, FILM COATED ORAL TWICE A DAY
Status: ACTIVE | COMMUNITY

## 2024-09-04 RX ORDER — LACTULOSE 10 G/15ML
15 ML AS NEEDED SOLUTION ORAL
Qty: 1350 ML | Refills: 2 | Status: ON HOLD | COMMUNITY

## 2024-09-04 RX ORDER — RIFAXIMIN 550 MG/1
TAKE 1 TABLET BY MOUTH TWICE (2) DAILY TABLET ORAL
Qty: 180 | Refills: 0 | Status: ON HOLD | COMMUNITY

## 2024-09-04 RX ORDER — PREDNISONE 5 MG/1
1 TABLET TABLET ORAL ONCE A DAY
Status: ACTIVE | COMMUNITY

## 2024-09-04 RX ORDER — SULFAMETHOXAZOLE AND TRIMETHOPRIM 400; 80 MG/1; MG/1
1 TABLET TABLET ORAL ONCE A DAY
Status: ACTIVE | COMMUNITY

## 2024-09-04 RX ORDER — MULTIVITAMIN
1 TABLET TABLET ORAL ONCE A DAY
Status: ACTIVE | COMMUNITY

## 2024-09-04 RX ORDER — BENAZEPRIL HYDROCHLORIDE 10 MG/1
1 TABLET TABLET, FILM COATED ORAL ONCE A DAY
Status: ON HOLD | COMMUNITY

## 2024-09-04 RX ORDER — FUROSEMIDE 20 MG/1
1 TABLET TABLET ORAL ONCE A DAY
Status: ON HOLD | COMMUNITY

## 2024-09-04 RX ORDER — POTASSIUM CHLORIDE 1.5 G/1.58G
1 PACKET WITH FOOD POWDER, FOR SOLUTION ORAL ONCE A DAY
Status: ON HOLD | COMMUNITY

## 2024-09-04 RX ORDER — TACROLIMUS 5 MG/1
AS DIRECTED CAPSULE, GELATIN COATED ORAL
Status: ACTIVE | COMMUNITY

## 2024-09-04 RX ORDER — NADOLOL 20 MG/1
2 TABLETS TABLET ORAL ONCE A DAY
Status: ON HOLD | COMMUNITY

## 2024-09-04 RX ORDER — TACROLIMUS 1 MG/1
AS DIRECTED GRANULE, FOR SUSPENSION ORAL
Status: ACTIVE | COMMUNITY

## 2024-09-04 RX ORDER — NYSTATIN 100000 [USP'U]/ML
4 ML SUSPENSION ORAL
Status: ACTIVE | COMMUNITY

## 2024-09-04 RX ORDER — SPIRONOLACTONE 25 MG/1
1 TABLET TABLET, FILM COATED ORAL ONCE A DAY
Status: ON HOLD | COMMUNITY

## 2024-09-04 RX ORDER — VALGANCICLOVIR 450 MG/1
1 TABLET WITH A MEAL TABLET, FILM COATED ORAL ONCE A DAY
Status: ACTIVE | COMMUNITY

## 2025-01-14 NOTE — HPI-TODAY'S VISIT:
Continue IV meropenem  ID consulted-awaiting further recommendations given the history of MDRO UTIs  Afebrile with normal WBC.   Patient is a 63 yo f and last seen may 2022 by Ms Primitivo Green and prior referred by Coleen Sánchez NP for an evaluation of fatty liver and cirrhosis and pse.   A copy of this note will be sent to the referring provider.        May visit with Cheli: Patient here for TH visit. she is on lactulose tid, having multiple bm's will send rx for xifaxan.   She did get it and she says it is helping with the memory.  Did labs today and will take a few days and [facility].  Prior due to MELD 21-she was sent to the transplant team, and they saw and we appreciate their input, MELD improved to 16, she is working on her diet and wanted her to be monitored.   They are planning to repeat imaging in Nov 15 and do labs and see Dr Khan then also.    She says she saw them already.  April 5 MRI shows lower thorax was normal. Liver had mild iron deposition. They did see morphologic changes of chronic liver disease with a diffusely nodular contour, small nodules, and caudate lobe hypertrophy. They did see a diffusely reticular pattern a bit lately but has been this compatible with extensive fibrosis.  They did not however see any suspicious liver lesions. The gallbladder was not well seen and may be absent.  No bile duct dilation seen. Spleen was top normal. Pancreas was normal. Adrenal glands were normal. Bilateral renal cysts were seen. Some prominent right cardiophrenic lymph nodes were seen measuring up to 1.9 x 1.0 cm. Extensive collateral vessels including esophageal varices, gastric wall varices and large pelvic collaterals were seen measuring up to 2.1 cm. Trace perihepatic ascites was seen. Overall they felt that you had signs of advanced chronic liver disease but with no suspicious lesions and they recommended a 6-month surveillance. They did see signs of portal hypertension including the esophageal varices, gastric varices and a large pelvic collaterals and trace perihepatic ascites.  I saw from the notes that you were pending an EGD surveillance and and she is doing that with local doctor for egd and colon.  feb 2022 labs na low 132, glucose 76, cr 1.04, albumin low 2.6. ammonia elevated 66. tb 5.7, ast 144, alt 71, alp 338. iron sat unable to calculate. ferritin elevated 839. f actin ab elevated 21, ama 6.3, smooth musc ab abnormal 1:20. immune to hep a. need hep b vaccine. hep c neg. alpha 1 MM.  Plan: 1. We need to be sure that we get the labs that she is doing. 2. Nov 15 mri and labs and lamonte to seem 3. Planning to see her at end of november and see how doing. 4. Staying on the low fat diet.  Stressed to pt the need for social distancing and strict handwashing and wearing a mask and to follow any other new or added CDC recommendations as this is an evolving target.  Duration of the visit was 30 min with 10 min of chart prep reviewing data and information that was available for the visit and setting up in ecw and then an additional 20 min from 124 to 144 pm by clock today as healow clock fluxed for the telemed visit today via [employer]  with time spent reviewing said material and their clinical correlates and then with this information being used to formulate a treatment plan.